# Patient Record
Sex: FEMALE
[De-identification: names, ages, dates, MRNs, and addresses within clinical notes are randomized per-mention and may not be internally consistent; named-entity substitution may affect disease eponyms.]

---
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
## 2021-06-08 ENCOUNTER — APPOINTMENT (OUTPATIENT)
Dept: OTOLARYNGOLOGY | Facility: CLINIC | Age: 21
End: 2021-06-08
Payer: COMMERCIAL

## 2021-06-08 VITALS
HEART RATE: 74 BPM | SYSTOLIC BLOOD PRESSURE: 112 MMHG | DIASTOLIC BLOOD PRESSURE: 71 MMHG | HEIGHT: 64 IN | WEIGHT: 130 LBS | TEMPERATURE: 96.2 F | BODY MASS INDEX: 22.2 KG/M2

## 2021-06-08 DIAGNOSIS — J31.0 CHRONIC RHINITIS: ICD-10-CM

## 2021-06-08 DIAGNOSIS — J32.9 CHRONIC SINUSITIS, UNSPECIFIED: ICD-10-CM

## 2021-06-08 PROCEDURE — 99203 OFFICE O/P NEW LOW 30 MIN: CPT

## 2021-06-08 PROCEDURE — 99072 ADDL SUPL MATRL&STAF TM PHE: CPT

## 2021-06-18 NOTE — ASSESSMENT
[FreeTextEntry1] : CHRONIC RHINITIS\par LOOKS LIKE ALLERGY RELATED\par ALLERGY REGFERAL\par FLONSE\par EUCALYPTUS HUMIDIFIER\par CT SINUS\par F/U AFTER ABOVE

## 2021-06-18 NOTE — REASON FOR VISIT
[Initial Consultation] : an initial consultation for [FreeTextEntry2] : sinus pressure, post nasal drip

## 2021-06-18 NOTE — REVIEW OF SYSTEMS
[Sneezing] : sneezing [Seasonal Allergies] : seasonal allergies [Post Nasal Drip] : post nasal drip [Nasal Congestion] : nasal congestion [Nose Bleeds] : nose bleeds [Recurrent Sinus Infections] : recurrent sinus infections [Sinus Pain] : sinus pain [Sinus Pressure] : sinus pressure [Discolored Nasal Discharge] : discolored nasal discharge [Throat Clearing] : throat clearing [Throat Pain] : throat pain [Throat Dryness] : throat dryness [Throat Itching] : throat itching [Swelling Neck] : swelling neck [Swelling Face] : face swelling [Eyes Itch] : itching of the eyes [Wheezing] : wheezing [Cough] : cough [Negative] : Heme/Lymph [Patient Intake Form Reviewed] : Patient intake form was reviewed [FreeTextEntry3] : watery eyes  [FreeTextEntry9] : muscle aches  [de-identified] : headache  [FreeTextEntry1] : fatigue, hives

## 2021-06-18 NOTE — PHYSICAL EXAM
[Normal] : mucosa is normal [] : septum deviated to the right [de-identified] : MILD RHINITIS [Midline] : trachea located in midline position

## 2021-06-21 ENCOUNTER — APPOINTMENT (OUTPATIENT)
Dept: OTOLARYNGOLOGY | Facility: CLINIC | Age: 21
End: 2021-06-21